# Patient Record
Sex: MALE | Race: WHITE | Employment: FULL TIME | URBAN - METROPOLITAN AREA
[De-identification: names, ages, dates, MRNs, and addresses within clinical notes are randomized per-mention and may not be internally consistent; named-entity substitution may affect disease eponyms.]

---

## 2017-01-12 ENCOUNTER — COMMUNICATION - HEALTHEAST (OUTPATIENT)
Dept: FAMILY MEDICINE | Facility: CLINIC | Age: 57
End: 2017-01-12

## 2017-02-14 ENCOUNTER — OFFICE VISIT - HEALTHEAST (OUTPATIENT)
Dept: FAMILY MEDICINE | Facility: CLINIC | Age: 57
End: 2017-02-14

## 2017-02-14 DIAGNOSIS — J32.9 SINUSITIS: ICD-10-CM

## 2017-02-14 DIAGNOSIS — R73.03 PREDIABETES: ICD-10-CM

## 2017-02-14 DIAGNOSIS — Z00.00 ROUTINE GENERAL MEDICAL EXAMINATION AT A HEALTH CARE FACILITY: ICD-10-CM

## 2017-02-14 DIAGNOSIS — L91.8 ACROCHORDON: ICD-10-CM

## 2017-02-14 LAB
CHOLEST SERPL-MCNC: 226 MG/DL
FASTING STATUS PATIENT QL REPORTED: YES
HBA1C MFR BLD: 6.5 % (ref 3.5–6.1)
HDLC SERPL-MCNC: 49 MG/DL
LDLC SERPL CALC-MCNC: 159 MG/DL
PSA SERPL-MCNC: 1.3 NG/ML (ref 0–3.5)
TRIGL SERPL-MCNC: 89 MG/DL

## 2017-02-14 ASSESSMENT — MIFFLIN-ST. JEOR: SCORE: 2008.05

## 2017-02-21 ENCOUNTER — OFFICE VISIT - HEALTHEAST (OUTPATIENT)
Dept: FAMILY MEDICINE | Facility: CLINIC | Age: 57
End: 2017-02-21

## 2017-02-21 DIAGNOSIS — E78.5 DYSLIPIDEMIA: ICD-10-CM

## 2017-02-21 DIAGNOSIS — E11.9 TYPE 2 DIABETES MELLITUS WITHOUT COMPLICATION, WITHOUT LONG-TERM CURRENT USE OF INSULIN (H): ICD-10-CM

## 2017-03-15 ENCOUNTER — RECORDS - HEALTHEAST (OUTPATIENT)
Dept: ADMINISTRATIVE | Facility: OTHER | Age: 57
End: 2017-03-15

## 2017-05-02 ENCOUNTER — COMMUNICATION - HEALTHEAST (OUTPATIENT)
Dept: FAMILY MEDICINE | Facility: CLINIC | Age: 57
End: 2017-05-02

## 2017-05-02 DIAGNOSIS — E78.5 HYPERLIPIDEMIA: ICD-10-CM

## 2017-07-15 ENCOUNTER — COMMUNICATION - HEALTHEAST (OUTPATIENT)
Dept: FAMILY MEDICINE | Facility: CLINIC | Age: 57
End: 2017-07-15

## 2017-07-15 DIAGNOSIS — E78.5 HYPERLIPIDEMIA: ICD-10-CM

## 2017-07-20 ENCOUNTER — COMMUNICATION - HEALTHEAST (OUTPATIENT)
Dept: FAMILY MEDICINE | Facility: CLINIC | Age: 57
End: 2017-07-20

## 2017-07-21 ENCOUNTER — COMMUNICATION - HEALTHEAST (OUTPATIENT)
Dept: FAMILY MEDICINE | Facility: CLINIC | Age: 57
End: 2017-07-21

## 2017-07-21 ENCOUNTER — COMMUNICATION - HEALTHEAST (OUTPATIENT)
Dept: SCHEDULING | Facility: CLINIC | Age: 57
End: 2017-07-21

## 2017-08-01 ENCOUNTER — OFFICE VISIT - HEALTHEAST (OUTPATIENT)
Dept: FAMILY MEDICINE | Facility: CLINIC | Age: 57
End: 2017-08-01

## 2017-08-01 DIAGNOSIS — E11.9 DIABETES MELLITUS (H): ICD-10-CM

## 2017-08-01 DIAGNOSIS — E78.5 DYSLIPIDEMIA: ICD-10-CM

## 2017-08-01 LAB
ALT SERPL W P-5'-P-CCNC: 25 U/L (ref 0–45)
CHOLEST SERPL-MCNC: 157 MG/DL
FASTING STATUS PATIENT QL REPORTED: YES
HBA1C MFR BLD: 6.2 % (ref 3.5–6.1)
HDLC SERPL-MCNC: 46 MG/DL
LDLC SERPL CALC-MCNC: 98 MG/DL
TRIGL SERPL-MCNC: 63 MG/DL

## 2017-08-02 ENCOUNTER — COMMUNICATION - HEALTHEAST (OUTPATIENT)
Dept: FAMILY MEDICINE | Facility: CLINIC | Age: 57
End: 2017-08-02

## 2017-08-02 DIAGNOSIS — E11.9 DIABETES (H): ICD-10-CM

## 2017-10-16 ENCOUNTER — AMBULATORY - HEALTHEAST (OUTPATIENT)
Dept: FAMILY MEDICINE | Facility: CLINIC | Age: 57
End: 2017-10-16

## 2017-10-16 ENCOUNTER — COMMUNICATION - HEALTHEAST (OUTPATIENT)
Dept: FAMILY MEDICINE | Facility: CLINIC | Age: 57
End: 2017-10-16

## 2017-11-13 ENCOUNTER — COMMUNICATION - HEALTHEAST (OUTPATIENT)
Dept: FAMILY MEDICINE | Facility: CLINIC | Age: 57
End: 2017-11-13

## 2017-11-14 ENCOUNTER — AMBULATORY - HEALTHEAST (OUTPATIENT)
Dept: FAMILY MEDICINE | Facility: CLINIC | Age: 57
End: 2017-11-14

## 2017-11-14 DIAGNOSIS — J32.9 SINUSITIS: ICD-10-CM

## 2018-01-04 ENCOUNTER — OFFICE VISIT - HEALTHEAST (OUTPATIENT)
Dept: OTOLARYNGOLOGY | Facility: CLINIC | Age: 58
End: 2018-01-04

## 2018-01-04 DIAGNOSIS — R43.1 PAROSMIA: ICD-10-CM

## 2018-01-11 ENCOUNTER — COMMUNICATION - HEALTHEAST (OUTPATIENT)
Dept: FAMILY MEDICINE | Facility: CLINIC | Age: 58
End: 2018-01-11

## 2018-01-11 DIAGNOSIS — E78.5 HYPERLIPIDEMIA: ICD-10-CM

## 2018-02-01 ENCOUNTER — HOSPITAL ENCOUNTER (OUTPATIENT)
Dept: CT IMAGING | Facility: CLINIC | Age: 58
Discharge: HOME | End: 2018-02-01
Attending: OTOLARYNGOLOGY

## 2018-02-01 DIAGNOSIS — R43.1 PAROSMIA: ICD-10-CM

## 2018-02-01 LAB
CREAT BLD-MCNC: 1 MG/DL
POC GFR AMER AF HE - HISTORICAL: >60 ML/MIN/1.73M2
POC GFR NON AMER AF HE - HISTORICAL: >60 ML/MIN/1.73M2

## 2018-02-07 ENCOUNTER — COMMUNICATION - HEALTHEAST (OUTPATIENT)
Dept: OTOLARYNGOLOGY | Facility: CLINIC | Age: 58
End: 2018-02-07

## 2018-04-19 ENCOUNTER — AMBULATORY - HEALTHEAST (OUTPATIENT)
Dept: FAMILY MEDICINE | Facility: CLINIC | Age: 58
End: 2018-04-19

## 2018-04-23 ENCOUNTER — COMMUNICATION - HEALTHEAST (OUTPATIENT)
Dept: ADMINISTRATIVE | Facility: CLINIC | Age: 58
End: 2018-04-23

## 2018-04-24 ENCOUNTER — AMBULATORY - HEALTHEAST (OUTPATIENT)
Dept: FAMILY MEDICINE | Facility: CLINIC | Age: 58
End: 2018-04-24

## 2018-04-24 DIAGNOSIS — K63.5 COLON POLYP: ICD-10-CM

## 2018-04-24 DIAGNOSIS — Z12.11 SCREENING FOR COLON CANCER: ICD-10-CM

## 2018-05-10 ENCOUNTER — RECORDS - HEALTHEAST (OUTPATIENT)
Dept: ADMINISTRATIVE | Facility: OTHER | Age: 58
End: 2018-05-10

## 2018-05-11 ENCOUNTER — RECORDS - HEALTHEAST (OUTPATIENT)
Dept: ADMINISTRATIVE | Facility: OTHER | Age: 58
End: 2018-05-11

## 2018-07-10 ENCOUNTER — COMMUNICATION - HEALTHEAST (OUTPATIENT)
Dept: FAMILY MEDICINE | Facility: CLINIC | Age: 58
End: 2018-07-10

## 2018-07-10 DIAGNOSIS — E78.5 HYPERLIPIDEMIA: ICD-10-CM

## 2019-04-24 ENCOUNTER — RECORDS - HEALTHEAST (OUTPATIENT)
Dept: ADMINISTRATIVE | Facility: OTHER | Age: 59
End: 2019-04-24

## 2019-04-30 ENCOUNTER — RECORDS - HEALTHEAST (OUTPATIENT)
Dept: ADMINISTRATIVE | Facility: OTHER | Age: 59
End: 2019-04-30

## 2019-05-22 ENCOUNTER — RECORDS - HEALTHEAST (OUTPATIENT)
Dept: ADMINISTRATIVE | Facility: OTHER | Age: 59
End: 2019-05-22

## 2019-07-09 ENCOUNTER — RECORDS - HEALTHEAST (OUTPATIENT)
Dept: ADMINISTRATIVE | Facility: OTHER | Age: 59
End: 2019-07-09

## 2019-08-28 ENCOUNTER — RECORDS - HEALTHEAST (OUTPATIENT)
Dept: ADMINISTRATIVE | Facility: OTHER | Age: 59
End: 2019-08-28

## 2021-03-12 ENCOUNTER — TRANSFERRED RECORDS (OUTPATIENT)
Dept: HEALTH INFORMATION MANAGEMENT | Facility: CLINIC | Age: 61
End: 2021-03-12

## 2021-03-19 ENCOUNTER — TRANSCRIBE ORDERS (OUTPATIENT)
Dept: OTHER | Age: 61
End: 2021-03-19

## 2021-03-19 DIAGNOSIS — H57.819 BROW PTOSIS: Primary | ICD-10-CM

## 2021-03-19 DIAGNOSIS — H02.839 DERMATOCHALASIS: ICD-10-CM

## 2021-03-24 ENCOUNTER — MEDICAL CORRESPONDENCE (OUTPATIENT)
Dept: HEALTH INFORMATION MANAGEMENT | Facility: CLINIC | Age: 61
End: 2021-03-24

## 2021-03-24 NOTE — TELEPHONE ENCOUNTER
FUTURE VISIT INFORMATION      FUTURE VISIT INFORMATION:    Date: 4.26.21    Time: 2:30     Location: [facility]  REFERRAL INFORMATION:    Referring provider:  IP    Referring providers clinic:  [facility]    Reason for visit/diagnosis: Brow Ptsosis OU, dermatochalasis each eye.    RECORDS REQUESTED FROM:       Clinic name Comments Records Status Imaging Status   [facility] 3.12.21 Adan Berman Scanned                                    Action 3.24.21 8:14 AM KRIS   Action Taken Requested records from the -[id number]

## 2021-04-26 ENCOUNTER — PRE VISIT (OUTPATIENT)
Dept: OPHTHALMOLOGY | Facility: CLINIC | Age: 61
End: 2021-04-26

## 2021-04-26 ENCOUNTER — OFFICE VISIT (OUTPATIENT)
Dept: OPHTHALMOLOGY | Facility: CLINIC | Age: 61
End: 2021-04-26
Attending: NURSE PRACTITIONER
Payer: COMMERCIAL

## 2021-04-26 ENCOUNTER — TELEPHONE (OUTPATIENT)
Dept: OPHTHALMOLOGY | Facility: CLINIC | Age: 61
End: 2021-04-26

## 2021-04-26 VITALS — WEIGHT: 234 LBS | BODY MASS INDEX: 34.66 KG/M2 | HEIGHT: 69 IN

## 2021-04-26 DIAGNOSIS — H57.819 BROW PTOSIS: ICD-10-CM

## 2021-04-26 DIAGNOSIS — H02.839 DERMATOCHALASIS: ICD-10-CM

## 2021-04-26 PROCEDURE — 99204 OFFICE O/P NEW MOD 45 MIN: CPT | Performed by: OPHTHALMOLOGY

## 2021-04-26 PROCEDURE — 92285 EXTERNAL OCULAR PHOTOGRAPHY: CPT | Performed by: OPHTHALMOLOGY

## 2021-04-26 PROCEDURE — 92082 INTERMEDIATE VISUAL FIELD XM: CPT | Performed by: OPHTHALMOLOGY

## 2021-04-26 RX ORDER — METFORMIN HCL 500 MG
TABLET, EXTENDED RELEASE 24 HR ORAL
COMMUNITY
Start: 2021-04-03

## 2021-04-26 RX ORDER — ATORVASTATIN CALCIUM 20 MG/1
TABLET, FILM COATED ORAL
COMMUNITY
Start: 2021-04-03

## 2021-04-26 ASSESSMENT — REFRACTION_WEARINGRX
OS_AXIS: 002
OD_SPHERE: +0.00
OS_CYLINDER: +0.75
SPECS_TYPE: PAL
OD_AXIS: 005
OS_SPHERE: +0.00
OD_ADD: +2.50
OS_ADD: +2.50
OD_CYLINDER: +1.25

## 2021-04-26 ASSESSMENT — CONF VISUAL FIELD
OS_SUPERIOR_TEMPORAL_RESTRICTION: 3
OD_SUPERIOR_TEMPORAL_RESTRICTION: 3
OS_SUPERIOR_NASAL_RESTRICTION: 3
OD_SUPERIOR_NASAL_RESTRICTION: 3

## 2021-04-26 ASSESSMENT — TONOMETRY
OS_IOP_MMHG: 8
OD_IOP_MMHG: 9
IOP_METHOD: ICARE

## 2021-04-26 ASSESSMENT — VISUAL ACUITY
OD_CC+: -2
OS_CC: J1+
OS_CC+: -3
OD_CC: J1+
OS_CC: 20/20
CORRECTION_TYPE: GLASSES
OD_CC: 20/20
METHOD: SNELLEN - LINEAR

## 2021-04-26 ASSESSMENT — MIFFLIN-ST. JEOR: SCORE: 1851.8

## 2021-04-26 NOTE — PROGRESS NOTES
Chief Complaints and History of Present Illnesses   Patient presents with     Consult For     Patient present today for Evaluation for Brow Ptosis/ Dermatochalasis each eye.        Chief Complaint(s) and History of Present Illness(es)     Consult For     In both eyes.  Associated symptoms include Negative for eye pain,   redness, tearing and dryness.  Treatments tried include no treatments.    Pain was noted as 0/10. Additional comments: Patient present today for   Evaluation for Brow Ptosis/ Dermatochalasis each eye.                 Comments     Here by recommendation of Dr Damon. Patient states that he finds the   need to try to force lids open to seem more to see the past few year. Does   get headaches the past few years. No pattern to headaches. Need more light   to read with shadowed vision. Peripheral vision is decrease with lids in   the way with each eye.     \No results found for: A1C      Sylviesa Mcrae, COT COT 2:26 PM April 26, 2021                      Assessment & Plan     Sean Bray is a 61 year old male with the following diagnoses:   1. Brow ptosis    2. Dermatochalasis       FUNCTIONAL COMPLAINTS RELATED TO DROOPY EYELIDS/BROWS:  Sean Bray describes upper lids interfering with superior visual field and interfering with activities of daily living including reading, driving and watching television.     EXAM:   Dominant eye right    MRD1: Right eye 2   Left eye 2 (with brows down)  Dermatochalasis with excess skin touching eyelashes   Brow ptosis with brow resting below superior orbital rim     VISUAL FIELD:  Right eye untaped:0 degrees Right eye taped:55 degrees  Left eye untaped:0 degrees Left eye taped:55 degrees    Right eye visual field improves by: 55 degrees  Left eye visual field improves by: 55 degrees      Bilateral upper blepharoplasty skin/muscle  Bilateral brow ptosis repair (direct brow lift) - Brow ptosis repair is being performed to support and lift the brows which are  resting below the orbital rim and to prevent post blepharoplasty brow descent which will make the lid position worse.      Attending Physician Attestation:  Complete documentation of historical and exam elements from today's encounter can be found in the full encounter summary report (not reduplicated in this progress note).  I personally obtained the chief complaint(s) and history of present illness.  I confirmed and edited as necessary the review of systems, past medical/surgical history, family history, social history, and examination findings as documented by others; and I examined the patient myself.  I personally reviewed the relevant tests, images, and reports as documented above.  I formulated and edited as necessary the assessment and plan and discussed the findings and management plan with the patient and family. I personally reviewed the ophthalmic test(s) associated with this encounter, agree with the interpretation(s) as documented by the resident/fellow, and have edited the corresponding report(s) as necessary.   -Geremias Gil MD  2:50 PM 4/26/2021

## 2021-04-26 NOTE — NURSING NOTE
Chief Complaints and History of Present Illnesses   Patient presents with     Consult For     Patient present today for Evaluation for Brow Ptosis/ Dermatochalasis each eye.        Chief Complaint(s) and History of Present Illness(es)     Consult For     Laterality: both eyes    Associated symptoms: Negative for eye pain, redness, tearing and dryness    Treatments tried: no treatments    Pain scale: 0/10    Comments: Patient present today for Evaluation for Brow Ptosis/ Dermatochalasis each eye.                 Comments     Here by recommendation of Dr Damon. Patient states that he finds the need to try to force lids open to seem more to see the past few year. Does get headaches the past few years. No pattern to headaches. Need more light to read with shadowed vision. Peripheral vision is decrease with lids in the way with each eye.     Sylvie Mcrae, COT COT 2:26 PM April 26, 2021

## 2021-04-26 NOTE — NURSING NOTE
Chief Complaints and History of Present Illnesses   Patient presents with     Consult For     Patient present today for Evaluation for Brow Ptosis/ Dermatochalasis each eye.        Chief Complaint(s) and History of Present Illness(es)     Consult For     Laterality: both eyes    Associated symptoms: Negative for eye pain, redness, tearing and dryness    Treatments tried: no treatments    Pain scale: 0/10    Comments: Patient present today for Evaluation for Brow Ptosis/ Dermatochalasis each eye.                 Comments     Here by recommendation of Dr Damon. Patient states that he finds the need to try to force lids open to seem more to see the past few year. Does get headaches the past few years. No pattern to headaches. Need more light to read with shadowed vision. Peripheral vision is decrease with lids in the way with each eye.     \No results found for: A1C      Sylvie Mcrae, COT COT 2:26 PM April 26, 2021

## 2021-04-26 NOTE — TELEPHONE ENCOUNTER
Spoke with patient to schedule surgery with Dr. Gil    Surgery was scheduled on 6/2 at [facility]  Patient will have H&P at [facility]     Patient is aware a COVID-19 test is needed before their procedure. The test should be with-in 4 days of their procedure.   Test Details: Date 5/29 OR 6/1 Location [place], [place]    Post-Op visit was scheduled on 6/18  Patient is aware a / is needed day of surgery.   Surgery packet was mailed 4/26, patient has my direct contact information for any further questions.

## 2021-05-01 ENCOUNTER — HEALTH MAINTENANCE LETTER (OUTPATIENT)
Age: 61
End: 2021-05-01

## 2021-05-16 DIAGNOSIS — Z11.59 ENCOUNTER FOR SCREENING FOR OTHER VIRAL DISEASES: ICD-10-CM

## 2021-05-19 ENCOUNTER — TRANSFERRED RECORDS (OUTPATIENT)
Dept: HEALTH INFORMATION MANAGEMENT | Facility: CLINIC | Age: 61
End: 2021-05-19

## 2021-05-19 ENCOUNTER — TELEPHONE (OUTPATIENT)
Dept: OPHTHALMOLOGY | Facility: CLINIC | Age: 61
End: 2021-05-19

## 2021-05-19 NOTE — TELEPHONE ENCOUNTER
Returned called and received voicemail. I left my direct dial number and told him I would try back tomorrow.  Luz Marina Ramos RN RN 3:43 PM 05/19/21

## 2021-05-19 NOTE — TELEPHONE ENCOUNTER
[facility] Call Center    Phone Message    May a detailed message be left on voicemail: yes     Reason for Call: Other: Sean calling to request a call back. He has some questions on his upcoming procedure 06/02 and would like to speak to his care team. Please call him back to discuss.       Action Taken: Message routed to:  [facility] ([facility]  eye    Travel Screening: Not Applicable

## 2021-05-21 NOTE — TELEPHONE ENCOUNTER
Patient left a voicemail and has no other questions.  Luz Marina Ramos RN RN 11:48 AM 05/21/21

## 2021-05-30 VITALS — BODY MASS INDEX: 38.18 KG/M2 | WEIGHT: 266.7 LBS | HEIGHT: 70 IN

## 2021-05-31 VITALS — BODY MASS INDEX: 36.65 KG/M2 | WEIGHT: 251.8 LBS

## 2021-06-01 ENCOUNTER — ANESTHESIA EVENT (OUTPATIENT)
Dept: SURGERY | Facility: AMBULATORY SURGERY CENTER | Age: 61
End: 2021-06-01
Payer: COMMERCIAL

## 2021-06-02 ENCOUNTER — ANESTHESIA (OUTPATIENT)
Dept: SURGERY | Facility: AMBULATORY SURGERY CENTER | Age: 61
End: 2021-06-02
Payer: COMMERCIAL

## 2021-06-02 ENCOUNTER — HOSPITAL ENCOUNTER (OUTPATIENT)
Facility: AMBULATORY SURGERY CENTER | Age: 61
End: 2021-06-02
Attending: OPHTHALMOLOGY
Payer: COMMERCIAL

## 2021-06-02 VITALS
WEIGHT: 239 LBS | SYSTOLIC BLOOD PRESSURE: 132 MMHG | HEART RATE: 64 BPM | RESPIRATION RATE: 16 BRPM | DIASTOLIC BLOOD PRESSURE: 64 MMHG | BODY MASS INDEX: 34.22 KG/M2 | TEMPERATURE: 97.6 F | OXYGEN SATURATION: 95 % | HEIGHT: 70 IN

## 2021-06-02 DIAGNOSIS — H57.819 BROW PTOSIS: ICD-10-CM

## 2021-06-02 DIAGNOSIS — H02.839 DERMATOCHALASIS: ICD-10-CM

## 2021-06-02 LAB — GLUCOSE BLDC GLUCOMTR-MCNC: 151 MG/DL (ref 70–99)

## 2021-06-02 PROCEDURE — 67900 REPAIR BROW DEFECT: CPT | Mod: LT

## 2021-06-02 PROCEDURE — 15823 BLEPHARP UPR EYELID XCSV SKN: CPT | Mod: LT

## 2021-06-02 RX ORDER — LIDOCAINE HYDROCHLORIDE AND EPINEPHRINE 10; 10 MG/ML; UG/ML
INJECTION, SOLUTION INFILTRATION; PERINEURAL PRN
Status: DISCONTINUED | OUTPATIENT
Start: 2021-06-02 | End: 2021-06-02 | Stop reason: HOSPADM

## 2021-06-02 RX ORDER — NALOXONE HYDROCHLORIDE 0.4 MG/ML
0.4 INJECTION, SOLUTION INTRAMUSCULAR; INTRAVENOUS; SUBCUTANEOUS
Status: DISCONTINUED | OUTPATIENT
Start: 2021-06-02 | End: 2021-06-03 | Stop reason: HOSPADM

## 2021-06-02 RX ORDER — LIDOCAINE 40 MG/G
CREAM TOPICAL
Status: DISCONTINUED | OUTPATIENT
Start: 2021-06-02 | End: 2021-06-02 | Stop reason: HOSPADM

## 2021-06-02 RX ORDER — LIDOCAINE HYDROCHLORIDE 20 MG/ML
INJECTION, SOLUTION INFILTRATION; PERINEURAL PRN
Status: DISCONTINUED | OUTPATIENT
Start: 2021-06-02 | End: 2021-06-02

## 2021-06-02 RX ORDER — NALOXONE HYDROCHLORIDE 0.4 MG/ML
0.2 INJECTION, SOLUTION INTRAMUSCULAR; INTRAVENOUS; SUBCUTANEOUS
Status: DISCONTINUED | OUTPATIENT
Start: 2021-06-02 | End: 2021-06-03 | Stop reason: HOSPADM

## 2021-06-02 RX ORDER — OXYCODONE HYDROCHLORIDE 5 MG/1
5 TABLET ORAL EVERY 6 HOURS PRN
Qty: 8 TABLET | Refills: 0 | Status: SHIPPED | OUTPATIENT
Start: 2021-06-02 | End: 2021-06-05

## 2021-06-02 RX ORDER — OXYCODONE HYDROCHLORIDE 5 MG/1
5 TABLET ORAL EVERY 4 HOURS PRN
Status: DISCONTINUED | OUTPATIENT
Start: 2021-06-02 | End: 2021-06-03 | Stop reason: HOSPADM

## 2021-06-02 RX ORDER — ERYTHROMYCIN 5 MG/G
OINTMENT OPHTHALMIC
Qty: 3.5 G | Refills: 0 | Status: SHIPPED | OUTPATIENT
Start: 2021-06-02 | End: 2021-06-18

## 2021-06-02 RX ORDER — ACETAMINOPHEN 325 MG/1
975 TABLET ORAL ONCE
Status: COMPLETED | OUTPATIENT
Start: 2021-06-02 | End: 2021-06-02

## 2021-06-02 RX ORDER — PROPOFOL 10 MG/ML
INJECTION, EMULSION INTRAVENOUS CONTINUOUS PRN
Status: DISCONTINUED | OUTPATIENT
Start: 2021-06-02 | End: 2021-06-02

## 2021-06-02 RX ORDER — MEPERIDINE HYDROCHLORIDE 25 MG/ML
12.5 INJECTION INTRAMUSCULAR; INTRAVENOUS; SUBCUTANEOUS
Status: DISCONTINUED | OUTPATIENT
Start: 2021-06-02 | End: 2021-06-03 | Stop reason: HOSPADM

## 2021-06-02 RX ORDER — ONDANSETRON 4 MG/1
4 TABLET, ORALLY DISINTEGRATING ORAL EVERY 30 MIN PRN
Status: DISCONTINUED | OUTPATIENT
Start: 2021-06-02 | End: 2021-06-03 | Stop reason: HOSPADM

## 2021-06-02 RX ORDER — FENTANYL CITRATE 50 UG/ML
25-50 INJECTION, SOLUTION INTRAMUSCULAR; INTRAVENOUS
Status: DISCONTINUED | OUTPATIENT
Start: 2021-06-02 | End: 2021-06-02 | Stop reason: HOSPADM

## 2021-06-02 RX ORDER — TETRACAINE HYDROCHLORIDE 5 MG/ML
SOLUTION OPHTHALMIC PRN
Status: DISCONTINUED | OUTPATIENT
Start: 2021-06-02 | End: 2021-06-02 | Stop reason: HOSPADM

## 2021-06-02 RX ORDER — PROPOFOL 10 MG/ML
INJECTION, EMULSION INTRAVENOUS PRN
Status: DISCONTINUED | OUTPATIENT
Start: 2021-06-02 | End: 2021-06-02

## 2021-06-02 RX ORDER — SODIUM CHLORIDE, SODIUM LACTATE, POTASSIUM CHLORIDE, CALCIUM CHLORIDE 600; 310; 30; 20 MG/100ML; MG/100ML; MG/100ML; MG/100ML
INJECTION, SOLUTION INTRAVENOUS CONTINUOUS
Status: DISCONTINUED | OUTPATIENT
Start: 2021-06-02 | End: 2021-06-02 | Stop reason: HOSPADM

## 2021-06-02 RX ORDER — GABAPENTIN 300 MG/1
300 CAPSULE ORAL ONCE
Status: COMPLETED | OUTPATIENT
Start: 2021-06-02 | End: 2021-06-02

## 2021-06-02 RX ORDER — SODIUM CHLORIDE, SODIUM LACTATE, POTASSIUM CHLORIDE, CALCIUM CHLORIDE 600; 310; 30; 20 MG/100ML; MG/100ML; MG/100ML; MG/100ML
INJECTION, SOLUTION INTRAVENOUS CONTINUOUS
Status: DISCONTINUED | OUTPATIENT
Start: 2021-06-02 | End: 2021-06-03 | Stop reason: HOSPADM

## 2021-06-02 RX ORDER — ERYTHROMYCIN 5 MG/G
OINTMENT OPHTHALMIC PRN
Status: DISCONTINUED | OUTPATIENT
Start: 2021-06-02 | End: 2021-06-02 | Stop reason: HOSPADM

## 2021-06-02 RX ORDER — ONDANSETRON 2 MG/ML
4 INJECTION INTRAMUSCULAR; INTRAVENOUS EVERY 30 MIN PRN
Status: DISCONTINUED | OUTPATIENT
Start: 2021-06-02 | End: 2021-06-03 | Stop reason: HOSPADM

## 2021-06-02 RX ADMIN — PROPOFOL 150 MCG/KG/MIN: 10 INJECTION, EMULSION INTRAVENOUS at 09:19

## 2021-06-02 RX ADMIN — ACETAMINOPHEN 975 MG: 325 TABLET ORAL at 08:30

## 2021-06-02 RX ADMIN — PROPOFOL 50 MG: 10 INJECTION, EMULSION INTRAVENOUS at 09:19

## 2021-06-02 RX ADMIN — SODIUM CHLORIDE, SODIUM LACTATE, POTASSIUM CHLORIDE, CALCIUM CHLORIDE: 600; 310; 30; 20 INJECTION, SOLUTION INTRAVENOUS at 08:41

## 2021-06-02 RX ADMIN — LIDOCAINE HYDROCHLORIDE 100 MG: 20 INJECTION, SOLUTION INFILTRATION; PERINEURAL at 09:18

## 2021-06-02 RX ADMIN — GABAPENTIN 300 MG: 300 CAPSULE ORAL at 08:30

## 2021-06-02 SDOH — HEALTH STABILITY: MENTAL HEALTH: HOW OFTEN DO YOU HAVE A DRINK CONTAINING ALCOHOL?: NEVER

## 2021-06-02 ASSESSMENT — MIFFLIN-ST. JEOR: SCORE: 1882.41

## 2021-06-02 NOTE — ANESTHESIA POSTPROCEDURE EVALUATION
Patient: Sean Bray    Procedure(s):  Bilateral upper BLEPHAROPLASTY  Bilateral direct brow lift    Diagnosis:Brow ptosis [H57.819]  Dermatochalasis [H02.839]  Diagnosis Additional Information: No value filed.    Anesthesia Type:  MAC    Note:  Disposition: Outpatient   Postop Pain Control: Uneventful            Sign Out: Well controlled pain   PONV: No   Neuro/Psych: Uneventful            Sign Out: Acceptable/Baseline neuro status   Airway/Respiratory: Uneventful            Sign Out: Acceptable/Baseline resp. status   CV/Hemodynamics: Uneventful            Sign Out: Acceptable CV status; No obvious hypovolemia; No obvious fluid overload   Other NRE: NONE   DID A NON-ROUTINE EVENT OCCUR? No           Last vitals:  Vitals:    06/02/21 1009 06/02/21 1012 06/02/21 1028   BP: (!) 89/49 105/48 132/64   Pulse: 72  64   Resp: 16  16   Temp: 36.4  C (97.6  F)     SpO2: 94%  95%       Last vitals prior to Anesthesia Care Transfer:  CRNA VITALS  6/2/2021 0935 - 6/2/2021 1035      6/2/2021             Resp Rate (set):  10          Electronically Signed By: Roc Stein MD  June 2, 2021  12:41 PM

## 2021-06-02 NOTE — OP NOTE
PREOPERATIVE DIAGNOSIS: Bilateral brow ptosis. Bilateral upper lids dermatochalasis.     POSTOPERATIVE DIAGNOSIS: Bilateral brow ptosis. Bilateral upper lids dermatochalasis.     PROCEDURE PERFORMED: Bilateral direct brow lift. Bilateral upper blepharoplasty.     SURGEON: Geremias Gil MD     ASSISTANT: Lenard Caldera MD and Marybeth Alvarenga MD     ANESTHESIA: Monitored with local infiltration 1% lidocaine with epinephrine [id number].     COMPLICATIONS: None.     ESTIMATED BLOOD LOSS: Less than 5 cc.     INDICATIONS: Sean Bray  presented with bilateral brow ptosis with the eyebrows resting below the orbital rim and dermatochalasis obstructing the superior and lateral visual fields. After the risks, benefits and alternatives to the proposed procedure were explained to the patient Informed Consent was obtained.     DESCRIPTION OF PROCEDURE: Sean Bray  was marked in the preoperative area. The superior brow hairs were marked, and the brow was lifted to its expected height, allowed to descend, and this point was marked on the eyebrow. An ellipse was drawn encompassing these marks on each side. Sean Bray  was brought to the operating room and placed supine on the operating table. The upper lid crease and excess upper eyelid skin was marked with marking pen and infiltrated with local anesthetic.  IV sedation was given. The brow areas and upper lids were infiltrated with a local anesthetic mixture, and the patient was prepped and draped in the typical sterile ophthalmic fashion. Attention was directed to the right side. The skin was incised following the marked lines. Skin and subcutaneous tissue were excised with a Mitzi scissors. Hemostasis was obtained with monopolar cautery. The deep tissues were closed with interrupted buried 5-0 Vicryl sutures securing the dermis. The skin was closed with a running 6-0 Prolene suture. Erythromycin ointment was applied. Attention was directed to the left side  where the same procedure was performed.   Attention was directed to the right side. Skin was incised following marked lines. Skin flap was excised with a high temperature cautery. A row of cautery was placed in the orbicularis along the inferior incision line. A strip of orbicularis was excised superiorly.   Hemostasis was obtained and the skin closed with running 6-0 plain gut suture. Attention was directed to the left side where the same procedure was performed.  Ophthalmic antibiotic ointment was applied to the eyelids and into the eyes.    The patient tolerated the procedures well. Sean Bray  left the operating room in stable condition.     MIKE THOMAS MD

## 2021-06-02 NOTE — ANESTHESIA CARE TRANSFER NOTE
Patient: Sean Bray    Procedure(s):  Bilateral upper BLEPHAROPLASTY  Bilateral direct brow lift    Diagnosis: Brow ptosis [H57.819]  Dermatochalasis [H02.839]  Diagnosis Additional Information: No value filed.    Anesthesia Type:   MAC     Note:      Level of Consciousness: awake  Oxygen Supplementation: room air    Independent Airway: airway patency satisfactory and stable  Dentition: dentition unchanged  Vital Signs Stable: post-procedure vital signs reviewed and stable  Report to RN Given: handoff report given  Patient transferred to: Phase II    Handoff Report: Identifed the Patient, Identified the Reponsible Provider, Reviewed the pertinent medical history, Discussed the surgical course, Reviewed Intra-OP anesthesia mangement and issues during anesthesia, Set expectations for post-procedure period and Allowed opportunity for questions and acknowledgement of understanding      Vitals: (Last set prior to Anesthesia Care Transfer)  CRNA VITALS  6/2/2021 0935 - 6/2/2021 1006      6/2/2021             Resp Rate (set):  10        Electronically Signed By: FANG Anaya CRNA  June 2, 2021  10:06 AM

## 2021-06-02 NOTE — DISCHARGE INSTRUCTIONS
Post-operative Instructions    Ophthalmic Plastic and Reconstructive Surgery  Geremias Gil M.D.  Lenard Caldera M.D., M.P.H.    All instructions apply to the operated eye(s) or eyelid(s)    What to expect after surgery:    There will be some swelling, bruising, and likely a black eye (even into the lower eyelids and cheeks). Also expect crusting and discharge from the eye and/or incisions.     A small amount of surface bleeding is normal for the first 48 hours after surgery.    You may notice some bloody tears for the first few days after surgery. This is normal.    Your eye(s) and eyelid(s) may be painful and tender. This is normal after surgery. Use the pain medication as prescribed. If your pain does not improve despite the medication, contact the office.    Wound care and personal care:    If a patch or bandage has been placed, please leave this in place until seen in clinic. Prevent the bandage from getting wet.     Apply ice compresses 15 minutes on 15 minutes off while awake for the first 2 days after surgery, then switch to warm compresses 4 times a day until seen by your physician.     For warm packs you can place a cup of dry uncooked rice in a clean cotton sock. Place sock in microwave 30 seconds to one minute. Next place the warm sock into a plastic bag and wrap the bag with clean warm wet washcloth and place over operated eye.      You may shower or wash your hair the day after surgery. Do not bathe or go swimming for 1 week to prevent contamination of your wounds.    Do not apply make-up to the eyes or eyelids for 2 weeks after surgery.    Activity restrictions and driving:    Avoid heavy lifting, bending, exercise or strenuous activity for 1 week after surgery.    You may resume other activities and return to work as tolerated.    You may not resume driving until have you stopped using narcotic pain medications(such as Norco, Percocet, Tylenol #3).    Sinus Precautions for 1 week: Sneeze  with mouth open. Cough with mouth open. No blowing nose. No straws. No bending over.    Medications:    Restart all your regular home medications and eye drops today. If you take Plavix or Aspirin on a regular basis, wait for 3 days after your surgery before restarting these in order to decrease the risk of bleeding complications.    Avoid aspirin and aspirin-like medications (Motrin, Aleve, Ibuprofen, Sabina-Mallie etc) for 5 days to reduce the risk of bleeding. You may take Tylenol (acetaminophen) for pain.    In addition to your home medications, take the following post-operative medications as prescribed by your physician:    Apply antibiotic ointment (erythromycin) to all sutures three times a day, and into the operated eye(s) at night.     Take scheduled extra strength Tylenol for pain.  You may take 1 to 2 pain pills (norco or oxycodone as prescribed) as needed for breakthrough pain up to every 6 hours.    The pain pills may make you drowsy. You must not drive a car, operate heavy machinery or drink alcohol while taking them.    The pain pills may cause constipation and nausea. Take them with some food to prevent a stomach upset. If you continue to experience nausea, call your physician.      WARNING: All the prescription pain medications listed above contain Tylenol (acetaminophen). You must not take more than 4,000 mg of acetaminophen per 24-hour period. This is equivalent to 6 tablets of Darvocet, 8 tablets of Vicodin, or 12 tablets of Norco, Percocet or Tylenol #3. If you take other over-the-counter medications containing acetaminophen, you must take the amount of acetaminophen into account and reduce the number of prescribed pain pills accordingly.    Contact information and follow-up:    Return to the Eye Clinic for a follow-up appointment with your physician as  scheduled. If no appointment has been scheduled, call [phone] for an  appointment with Dr. Gil within 1 to 2 weeks from your date of  "surgery.  -     Please email a few photos of your eye(s) or other operative site(s) to [email or web address] prior to your follow up visit.      For severe pain, bleeding, or loss of vision, call the Eye Clinic at [phone].    After hours or on weekends and holidays, call [phone] and ask to speak with the ophthalmologist on call.      [facility]  Home Care Following Anesthesia  For 24 hours after surgery:  1. Get plenty of rest.  A responsible adult must stay with you for at least 24 hours after you leave the surgery center.  2. Do not drive or use heavy equipment.  If you have weakness or tingling, don't drive or use heavy equipment until this feeling goes away.   3. Do not drink alcohol.   4. Avoid strenuous or risky activities.  Ask for help when climbing stairs.  5. You may feel lightheaded.  IF so, sit for a few minutes before standing.  Have someone help you get up.   6. If you have nausea (feel sick to your stomach): Drink only clear liquids such as apple juice, ginger ale, broth or 7-Up.  Rest may also help.  Be sure to drink enough fluids.  Move to a regular diet as you feel able.   7. You may have a slight fever.  Call the doctor if your fever is over 100 F (37.7 C) (taken under the tongue) or lasts longer than 24 hours.  8. You may have a dry mouth, a sore throat, muscle aches or trouble sleeping. These should go away after 24 hours.  9. Do not make important or legal decisions.   10. It is recommended to avoid smoking.        Today you received a Marcaine or bupivacaine block to numb the nerves near your surgery site.  This is a block using local anesthetic or \"numbing\" medication injected around the nerves to anesthetize or \"numb\" the area supplied by those nerves.  This block is injected into the muscle layer near your surgical site.  The medication may numb the location where you had surgery for 6-18 hours, but may last up to 24 hours.  If your surgical " site is an arm or leg you should be careful with your affected limb, since it is possible to injure your limb without being aware of it due to the numbing.  Until full feeling returns, you should guard against bumping or hitting your limb, and avoid extreme hot or cold temperatures on the skin.  As the block wears off, the feeling will return as a tingling or prickly sensation near your surgical site.  You will experience more discomfort from your incision as the feeling returns.  You may want to take a pain pill (a narcotic or Tylenol if this was prescribed by your surgeon) when you start to experience mild pain before the pain beccomes more severe.  If your pain medications do not control your pain you should notifiy your surgeon.    Tips for taking pain medications  To get the best pain relief possible, remember these points:    Take pain medications as directed, before pain becomes severe.    Pain medication can upset your stomach: taking it with food may help.    Constipation is a common side effect of pain medication. Drink plenty of  fluids.    Eat foods high in fiber. Take a stool softener if recommended by your doctor or pharmacist.    Do not drink alcohol, drive or operate machinery while taking pain medications.    Ask about other ways to control pain, such as with heat, ice or relaxation.    Tylenol/Acetaminophen Consumption  To help encourage the safe use of acetaminophen, the makers of TYLENOL  have lowered the maximum daily dose for single-ingredient Extra Strength TYLENOL  (acetaminophen) products sold in the [place] from 8 pills per day (4,000 mg) to 6 pills per day (3,000 mg). The dosing interval has also changed from 2 pills every 4-6 hours to 2 pills every 6 hours.    If you feel your pain relief is insufficient, you may take Tylenol/Acetaminophen in addition to your narcotic pain medication.     Be careful not to exceed 3,000 mg of Tylenol/Acetaminophen in a 24 hour period from all sources.    If  you are taking extra strength Tylenol/acetaminophen (500 mg), the maximum dose is 6 tablets in 24 hours.    If you are taking regular strength acetaminophen (325 mg), the maximum dose is 9 tablets in 24 hours.    Call a doctor for any of the followin. Signs of infection (fever, growing tenderness at the surgery site, a large amount of drainage or bleeding, severe pain, foul-smelling drainage, redness, swelling).  2. It has been over 8 to 10 hours since surgery and you are still not able to urinate (pass water).  3. Headache for over 24 hours.  4. Numbness, tingling or weakness the day after surgery (if you had spinal anesthesia).  5. Signs of Covid-19 infection (temperature over 100 degrees, shortness of breath, cough, loss of taste/smell, generalized body aches, persistent headache, chills, sore throat, nausea/vomiting/diarrhea)  Your doctor is:  Dr. Geremias Gil, Ophthalmology: [phone]  Or dial [phone] and ask for the resident on call for:  Ophthalmology  For emergency care, call the:  [facility]:  [phone] (TTY for hearing impaired: [phone])

## 2021-06-02 NOTE — ANESTHESIA PREPROCEDURE EVALUATION
Anesthesia Pre-Procedure Evaluation    Patient: Sean Bray   MRN: [id number] : 1960        Preoperative Diagnosis: Brow ptosis [H57.819]  Dermatochalasis [H02.839]   Procedure : Procedure(s):  Bilateral upper BLEPHAROPLASTY  Bilateral direct brow lift     Past Medical History:   Diagnosis Date     Broken humerus 2019     Diabetes (H)      Hyperlipidemia       Past Surgical History:   Procedure Laterality Date     CIRCUMCISION       VASECTOMY        No Known Allergies   Social History     Tobacco Use     Smoking status: Former Smoker     Quit date:      Years since quittin.4     Smokeless tobacco: Never Used   Substance Use Topics     Alcohol use: Never     Frequency: Never      Wt Readings from Last 1 Encounters:   21 108.4 kg (239 lb)        Anesthesia Evaluation            ROS/MED HX  ENT/Pulmonary:       Neurologic:       Cardiovascular:     (+) Dyslipidemia -----    METS/Exercise Tolerance:     Hematologic:       Musculoskeletal:       GI/Hepatic:       Renal/Genitourinary:       Endo:     (+) type II DM, Obesity,     Psychiatric/Substance Use:       Infectious Disease:       Malignancy:       Other:            Physical Exam    Airway        Mallampati: II   TM distance: > 3 FB      Respiratory Devices and Support         Dental           Cardiovascular          Rhythm and rate: regular and normal     Pulmonary           breath sounds clear to auscultation           OUTSIDE LABS:  CBC: No results found for: WBC, HGB, HCT, PLT  BMP: No results found for: NA, POTASSIUM, CHLORIDE, CO2, BUN, CR, GLC  COAGS: No results found for: PTT, INR, FIBR  POC:   Lab Results   Component Value Date     (H) 2021     HEPATIC: No results found for: ALBUMIN, PROTTOTAL, ALT, AST, GGT, ALKPHOS, BILITOTAL, BILIDIRECT, BALTAZAR  OTHER: No results found for: PH, LACT, A1C, MELISSA, PHOS, MAG, LIPASE, AMYLASE, TSH, T4, T3, CRP, SED    Anesthesia Plan    ASA Status:  3      Anesthesia Type: MAC.               Consents    Anesthesia Plan(s) and associated risks, benefits, and realistic alternatives discussed. Questions answered and patient/representative(s) expressed understanding.     - Discussed with:  Patient         Postoperative Care            Comments:         H&P reviewed: Unable to attach H&P to encounter due to EHR limitations. H&P Update: appropriate H&P reviewed, patient examined. No interval changes since H&P (within 30 days).         Roc Stein MD

## 2021-06-02 NOTE — BRIEF OP NOTE
[facility] Brief Operative Note    Pre-operative diagnosis: Brow ptosis [H57.819]  Dermatochalasis [H02.839]   Post-operative diagnosis Same   Procedure: Procedure(s):  Bilateral upper BLEPHAROPLASTY  Bilateral direct brow lift   Surgeon(s): Surgeon(s) and Role:     * Geremias Gil MD - Primary     * Lenard Caldera MD - Fellow - Assisting   Estimated blood loss: 2mL    Specimens: * No specimens in log *   Findings: As expected

## 2021-06-04 ENCOUNTER — TELEPHONE (OUTPATIENT)
Dept: OPHTHALMOLOGY | Facility: CLINIC | Age: 61
End: 2021-06-04

## 2021-06-08 NOTE — PROGRESS NOTES
ASSESSMENT & PLAN:  1. Routine general medical examination at a health care facility  - Lipid Cascade  - HM2(CBC w/o Differential)  - Glucose  - PSA (Prostatic-Specific Antigen), Annual Screen    2. Prediabetes  - Glycosylated Hemoglobin A1c    3. Sinusitis    4. Acrochordon     He is fasting.  We will do labs.  Limit sweets, carbs.  Healthy food choices.  Continue with physical activity.  For his sinusitis, will add prednisone short course.  Discussed medication and side effects.  Recheck if worse in the next 1-2 weeks.  For the skin tag, discussed of its benign nature.  Monitor for changes with time.  Encourage annual physical.  Colonoscopy in April 2018 unless changes noted.  Discussed hepatitis C screening as he had questions with it.  Declines at this time.  He was agreeable with the plans.    Orders Placed This Encounter   Procedures     Lipid Cascade     Order Specific Question:   Fasting is required?     Answer:   Yes     HM2(CBC w/o Differential)     Glucose     Glycosylated Hemoglobin A1c     PSA (Prostatic-Specific Antigen), Annual Screen       CHIEF COMPLAINT:  Chief Complaint   Patient presents with     Annual Exam     Pt is FASTING today.         HISTORY OF PRESENT ILLNESS:  Sean is a 56 y.o. male presenting to the clinic today for an annual exam, to discuss a skin tag, and to follow up on his sinusitis.    Chronic Sinusitis: He still has a persistent nasal issues; he has not smelled smoke for the past 4 days, but he has for every day prior to that since his last office visit. He has been noticing green and clear mucus and nasal discharge. He denies any fever, chills, emesis, nasal drainage to throat, hemoptysis, or any taste of blood in his mouth. He denies any exposure to illness; he does go to the gym 4-5 times per week, but he denies any recent travel. He was last seen on 12/20/2016 and he has finished his Omnicef antibiotic as prescribed for sinusitis; he has been using saline spray and Flonase  since then as well. He uses a saline spray when he is feeling more congested than normal. He uses Flonase once daily for 3 days in a row, then he takes a couple days off before starting it again. He has never used a Neti Pot.     Right Underarm Skin Tag: He noticed a mole in his right underarm while showering about 1 month ago. It was painful when he irritated it but it is not painful at all times. It has been changing in shape a bit, but not in color. It gets bigger and smaller on its own. It does not bleed on its own. He states it is less of a mole and more of a protrusion. He denies any history of skin cancer, but there is a recurring cyst on his nape region.     Health Maintenance: He is fasting today. He does not smoke or drink alcohol. His last colonoscopy was done in April 2015; he is due for repeat in April 2018. He is wondering if he should be tested for hepatitis C since his mother had non-alcoholic cirrhosis.     REVIEW OF SYSTEMS:   Obesity: He goes to the gym 4-5 days per week; he does weight training 3 days per week and rowing, bicycling, and pool the other days. He does 30 minutes of aerobic exercise each session. His weight loss has plateaued and so he is following a [employer] weight loss plan. He is a healthy eater; he has 2 cups of vegetables at lunch and dinner, lean protein once per day, protein shake in the morning, and three snacks per day of a 15 gram protein bar.  He does not eat sweets. He eats red meat occasionally in small portions.    He denies any fever, chills, cough, URI's, hematuria, hemoptysis, hematochezia, dizziness, LOC, chest pain, heart palpitations, SOB, abdominal pain, pelvic pain, dysuria, sudden weight loss, or changes in stool caliber. He denies any new chronic health conditions, hospitalizations or surgeries since his last office visit. All other systems are negative.     PFSH:    History   Smoking Status     Former Smoker     Packs/day: 1.00     Years: 25.00      "Types: Cigarettes     Quit date: 7/3/2007   Smokeless Tobacco     Never Used        Family History   Problem Relation Age of Onset     Cirrhosis Mother      non-alcoholic     Diabetes Mother      Colon cancer Father 75     Prostate cancer Father 60     Alzheimer's disease Father      Heart disease Paternal Grandfather      Heart disease Paternal Grandmother      Heart attack Maternal Grandfather      Colon polyps Sister      Cancer Other      Niece, blood     Diabetes Maternal Grandmother         Social History     Social History     Marital status:      Spouse name: N/A     Number of children: N/A     Years of education: N/A     Occupational History     Not on file.     Social History Main Topics     Smoking status: Former Smoker     Packs/day: 1.00     Years: 25.00     Types: Cigarettes     Quit date: 7/3/2007     Smokeless tobacco: Never Used     Alcohol use Not on file     Drug use: Not on file     Sexual activity: Not on file     Other Topics Concern     Not on file     Social History Narrative        Past Surgical History:   Procedure Laterality Date     CIRCUMCISION       VASECTOMY          No Known Allergies     Active Ambulatory Problems     Diagnosis Date Noted     Hyperlipidemia 12/29/2008     Borderline diabetes mellitus 03/14/2013     Resolved Ambulatory Problems     Diagnosis Date Noted     No Resolved Ambulatory Problems     Past Medical History:   Diagnosis Date     Dyslipidemia      Prediabetes         VITALS:  Vitals:    02/14/17 0901   BP: 130/70   Patient Site: Left Arm   Patient Position: Sitting   Cuff Size: Adult Large   Pulse: 74   SpO2: 95%   Weight: (!) 266 lb 11.2 oz (121 kg)   Height: 5' 9.5\" (1.765 m)     Wt Readings from Last 3 Encounters:   02/14/17 (!) 266 lb 11.2 oz (121 kg)   12/20/16 (!) 272 lb 8 oz (123.6 kg)   03/13/15 (!) 244 lb 9.6 oz (110.9 kg)     Body mass index is 38.82 kg/(m^2).     PHYSICAL EXAM:  Visit Vitals     /70 (Patient Site: Left Arm, Patient " "Position: Sitting, Cuff Size: Adult Large)     Pulse 74     Ht 5' 9.5\" (1.765 m)     Wt (!) 266 lb 11.2 oz (121 kg)     SpO2 95%     BMI 38.82 kg/m2       Vital signs noted above. AAO ×3. Wearing glasses. HEENT: Pain in the left maxillary region.  No nasal discharge, not inflamed turbinates, moist oral mucosa.  TMs intact bilateral, no fluid collection. Neck: Supple neck, nonpalpable cervical lymph nodes. No thyromegaly. Lungs: Clear to auscultation bilateral.  Heart: S1-S2 regular rate and rhythm, no murmurs were noted.  Abdomen: Flabby, soft with bowel sounds and nontender.  Extremities: No edema, pulses were full and equal. Genital exam: No hernia noted, testes descended bilaterally. Rectal: No skin tags or fissures.  Small hemorrhoid, noninflamed.  No mass or tenderness. Prostate not enlarged. Brown stool noted.  Skin: Polypoid mass on his right anterior axillary region.  No erythema, tenderness.    QUALITY MEASURES:  The following high BMI interventions were performed this visit: encouragement to exercise, dietary needs education and lifestyle education regarding diet     DATA REVIEWED:  ADDITIONAL HISTORY SUMMARIZED (2): None.   DECISION TO OBTAIN EXTRA INFORMATION (1): None.   RADIOLOGY TESTS (1): None.  LABS (1): Reviewed and ordered labs.  MEDICINE TESTS (1): None.  INDEPENDENT REVIEW (2 each): None.      The visit lasted a total of 23 minutes face to face with the patient. Over 50% of the time was spent counseling and educating the patient about his chronic health conditions, skin tag, sinusitis, diet, exercise, and health maintenance.     IAna Luisa, am scribing for and in the presence of Dr. Patel.  IDr. Patel, personally performed the services described in this documentation, as scribed by Ana Luisa Pierce in my presence, and it is both accurate and complete.     MEDICATIONS:  Current Outpatient Prescriptions   Medication Sig Dispense Refill     predniSONE (DELTASONE) 20 MG tablet Take 1 tablet (20 mg " total) by mouth daily for 7 days. 7 tablet 0     No current facility-administered medications for this visit.         Total data points: 1

## 2021-06-09 NOTE — PROGRESS NOTES
ASSESSMENT & PLAN:  1. Type 2 diabetes mellitus without complication, without long-term current use of insulin  - Microalbumin, Random Urine    2. Dyslipidemia     Discussed lab results with him.  We'll add microalbumin testing.  If positive, we'll start him on lisinopril.  Discussed medical condition in general, newly diagnosed diabetes.  Discussed complications brought on by diabetes.  Check his feet regularly.  Yearly eye exam.  He will set up an appointment with his ophthalmologist.  Continue with healthy food choices, regular exercise.  To start aspirin 81 mg daily.  We will also start him on Lipitor.  Recheck in 3 months.  He was agreeable with the plans.    Orders Placed This Encounter   Procedures     Microalbumin, Random Urine        CHIEF COMPLAINT:  Chief Complaint   Patient presents with     Lab Result Follow Up        HISTORY OF PRESENT ILLNESS:  Sean is a 56 y.o. male presenting to the clinic today to discuss his newly diagnosed diabetes. His A1c was 6.5 and his fasting glucose was 144 on 2/14/2017. He notes two years ago, his A1c was 5.7 and he had worked it down to that point; he is wondering why his A1c is still high right now even though he has been eating well and exercising for the past two months. He is wondering if his slow metabolism or diabetes is affecting his weight gain as well as his blood glucose. He is wondering if his testosterone is low and if that is affecting his metabolism. He walks with only socks on in his house. His last eye exam was more than 1 year ago. He has been continuing to eat according to the [employer] plan for the past two months; his protein bars are low in carbohydrates and he counts his carbs. He is exercising 4-5 times per week. He lost 30 pounds on the [place] diet over the course of 7 months in 2014. His total cholesterol was 226, triglycerides were 89, HDL was 49, and LDL was 159 on 2/14/2017. He was taking a statin medication a few years ago; he had  muscle aches for the first couple of weeks, but it was tolerable and went away on its own. He discontinued the medication after decreasing the dose to half and he was able to maintain good cholesterol with his diet and exercise. He is willing to start taking atorvastatin 20 mg and aspirin 81 mg daily. His feet go to sleep when he sits for a long period of time; he is able to walk the numbness off.     REVIEW OF SYSTEMS:   Recent URI: He has finished his course of prednisone, but he has still been using a nasal saline rinse. The nasal rinse has been uncomfortable; this morning, the solution seemed to be colder than normal and it hurt his sinuses. He notes he is improving otherwise. He denies any loss of sense of smell or taste; he has not been smelling smoke for the past one week.     Elevated Blood Pressure: His last two blood pressures in clinic were higher than 130/80, and he was expecting a discussion about starting a blood pressure medication today. He notes he wakes up about once per night to urinate already.    He denies any vision changes or sudden vision loss, dizziness, fainting, tremors, shaking, localized numbness or weakness, chest pain, heart palpitations, abdominal pain, nausea, or vomiting. All other systems are negative.     PFSH:     TOBACCO USE:  History   Smoking Status     Former Smoker     Packs/day: 1.00     Years: 25.00     Types: Cigarettes     Quit date: 7/3/2007   Smokeless Tobacco     Never Used        VITALS:  Vitals:    02/21/17 0844   BP: 136/62   Patient Site: Left Arm   Patient Position: Sitting   Cuff Size: Adult Large   Pulse: (!) 59   SpO2: 97%     Wt Readings from Last 3 Encounters:   02/14/17 (!) 266 lb 11.2 oz (121 kg)   12/20/16 (!) 272 lb 8 oz (123.6 kg)   03/13/15 (!) 244 lb 9.6 oz (110.9 kg)     There is no height or weight on file to calculate BMI.     PHYSICAL EXAM:  Visit Vitals     /62 (Patient Site: Left Arm, Patient Position: Sitting, Cuff Size: Adult Large)      Pulse (!) 59     SpO2 97%       Vital signs noted above. AAO ×3.  HEENT no nasal discharge, moist oral mucosa. Wearing glasses. Neck: Supple neck, nonpalpable cervical lymph nodes. Lungs: Clear to auscultation bilateral.  Heart: S1-S2 regular rate and rhythm, no murmurs were noted.  Abdomen: Flabby, soft with bowel sounds and nontender.  Extremities: No edema, pulses were full and equal. Monofilament exam was good.  Small amount of skin break noted in the left fourth to fifth interdigit.    QUALITY MEASURES:  The following high BMI interventions were performed this visit: encouragement to exercise, dietary needs education, dietary management education, guidance, and counseling and lifestyle education regarding diet    DATA REVIEWED:  ADDITIONAL HISTORY SUMMARIZED (2): None.   DECISION TO OBTAIN EXTRA INFORMATION (1): None.   RADIOLOGY TESTS (1): None.  LABS (1): Reviewed labs 2/17/2017.  MEDICINE TESTS (1): None.  INDEPENDENT REVIEW (2 each): None.     The visit lasted a total of 23 minutes face to face with the patient. Over 50% of the time was spent counseling and educating the patient about his new diagnosis of diabetes, diet, exercise, and coordination of care.     Ana Luisa HARRIS, am scribing for and in the presence of Dr. Patel.  IDr. Patel, personally performed the services described in this documentation, as scribed by Ana Luisa Pierce in my presence, and it is both accurate and complete.     MEDICATIONS:  Current Outpatient Prescriptions   Medication Sig Dispense Refill     atorvastatin (LIPITOR) 20 MG tablet Take 1 tablet (20 mg total) by mouth daily. 30 tablet 2     predniSONE (DELTASONE) 20 MG tablet Take 1 tablet (20 mg total) by mouth daily for 7 days. 7 tablet 0     No current facility-administered medications for this visit.         Total data points: 1

## 2021-06-12 NOTE — PROGRESS NOTES
Assessment/Plan:        Diagnoses and all orders for this visit:    Diabetes mellitus  -     Glycosylated Hemoglobin A1c  -     Basic Metabolic Panel  -     Cancel: ALT (SGPT)  -     Lipid Cascade    Dyslipidemia  -     ALT (SGPT)    Other orders  -     coenzyme Q10 200 mg capsule; Take 200 mg by mouth daily.  -     blood-glucose meter Misc; Use daily as directed  Dispense: 1 each; Refill: 0  -     generic lancets (FINGERSTIX LANCETS); Dispense brand per patient's insurance at pharmacy discretion.  Dispense: 100 each; Refill: 1  -     blood glucose test strips; Use 1 each As Directed as needed. Dispense brand per patient's insurance at pharmacy discretion.  Dispense: 100 strip; Refill: 1        He is fasting.  We will do labs.  Consider testing his blood sugars and discuss goals.  To keep a log of it.  Await results prior to further recommendations.  Continue to eat healthy, regular exercise.  Check his feet regularly.  Avoid walking barefoot.  Recheck in 6 months, earlier if levels worse.  He was agreeable with the plans.    25 minutes spent with more than 50% in counseling and discussion of treatment plans.  Subjective:    Patient ID: Sean Bray is a 57 y.o. male.    DARWIN Estrada is here for follow-up regarding his diabetes.  He has been eating healthy.  No breads, carbs, sweets, milk.  Breakfast is mostly protein shake or snack.  Lunch would be salads, tuna fish, eggs o with protein bar.  Dinner would be sad with lean meat.  He has been trying to walk and hit her on 8-12,000 steps per day.  Once or twice a week, he tries to do more.  He came back from a 2 weeks trip.  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  Not much of vegetables.  Mostly loaded with carbs.  He did a lot of walking, at least 8 miles per day.  Had some blisters in his feet, numbness which lasted for around 2 days.  He checks his feet once in a while, occasional barefoot.  No vision  changes.  He did try to have his eyes checked and in February and no diabetes noted.  Denies any chest pains, palpitations, dizziness.  No localized numbness, weakness.  He did injure his right shoulder doing some lifting but has been recovering from that.  Denies any issues with smoking, alcohol.  Compliant with medication intake.  On aspirin, Lipitor.    Review of Systems  As above otherwise negative.          Objective:    Physical Exam  /60 (Patient Site: Left Arm, Patient Position: Sitting, Cuff Size: Adult Large)  Pulse 66  Wt (!) 251 lb 12.8 oz (114.2 kg)  SpO2 97%  BMI 36.65 kg/m2    Vital signs noted above. AAO ×3.  HEENT no nasal discharge, moist oral mucosa. Neck: Supple neck, nonpalpable cervical lymph nodes.  Lungs: Clear to auscultation bilateral.  Heart: S1-S2 regular rate and rhythm, no murmurs were noted.  Abdomen: Flabby, soft with bowel sounds and nontender.  Extremities: No edema, pulses were full and equal.  Dry skin on his feet with some skin peeling including in between toes.  No maceration.  No blisters.  Monofilament testing was good.

## 2021-06-15 NOTE — PROGRESS NOTES
HPI: This patient is a 58yo M who presents for evaluation of his nose at the request of Dr. Patel. The patient reports some nasal congestion and clear rhinorrhea. What bothers him most is that over the past few months, he has been smelling smoke when there is none around. There have not really been episodes of high fever, localized sinus pain, tooth pain, and pururlent drainage. Not using any nasal saline or nasal steroid sprays. No vision changes, headaches, or inability to smell other environmental odorants.    Past medical history, surgical history, social history, family history, medications, and allergies have been reviewed with the patient and are documented above.    Review of Systems: a 10-system review was performed. Pertinent positives are noted in the HPI and on a separate scanned document in the chart.    PHYSICAL EXAMINATION:  GEN: no acute distress, normocephalic  EYES: extraocular movements are intact, pupils are equal and round. Sclera clear.   EARS: auricles are normally formed. The external auditory canals are clear with minimal to no cerumen. Tympanic membranes are intact bilaterally with no signs of infection, effusion, retractions, or perforations.  NOSE: anterior nares are patent. There are no masses or lesions. The septum is non-obstructing. No polyps visualized. No lesions/masses of the superior nasal cavity.  OC/OP: clear, dentition is in good repair. The tongue and palate are fully mobile and symmetric. The floor of mouth, base of tongue are symmetric.  NECK: soft and supple. No lymphadenopathy or masses. Airway is midline.  NEURO: CN II-XII are intact bilaterally. alert and oriented. No nystagmus. Gait is normal.  PULM: breathing comfortably on room air, normal chest expansion with respiration    MEDICAL DECISION-MAKING: This patient is a 58yo M with congestion from probable allergic rhinitis and congestion. Discussed the use of nasal steroid sprays. This is likely the cause of the erroneous  odors he is perceiving as well, but will need to perform imaging of his nasal/sinus cavities and the cribriform plate area. I will call him with the results of the CT scan when it is completed.

## 2021-06-18 ENCOUNTER — OFFICE VISIT (OUTPATIENT)
Dept: OPHTHALMOLOGY | Facility: CLINIC | Age: 61
End: 2021-06-18
Payer: COMMERCIAL

## 2021-06-18 DIAGNOSIS — H57.813 BROW PTOSIS, BILATERAL: ICD-10-CM

## 2021-06-18 DIAGNOSIS — H02.834 DERMATOCHALASIS OF BOTH UPPER EYELIDS: ICD-10-CM

## 2021-06-18 DIAGNOSIS — Z98.890 POSTOPERATIVE EYE STATE: Primary | ICD-10-CM

## 2021-06-18 DIAGNOSIS — H02.831 DERMATOCHALASIS OF BOTH UPPER EYELIDS: ICD-10-CM

## 2021-06-18 PROCEDURE — 99207 PR NO BILLABLE SERVICE THIS VISIT: CPT | Mod: GC | Performed by: OPHTHALMOLOGY

## 2021-06-18 ASSESSMENT — VISUAL ACUITY
OD_CC+: -1
METHOD: SNELLEN - LINEAR
OD_CC: 20/20
OS_CC: 20/20
CORRECTION_TYPE: GLASSES

## 2021-06-18 ASSESSMENT — TONOMETRY
OS_IOP_MMHG: 09
IOP_METHOD: ICARE
OD_IOP_MMHG: 09

## 2021-06-18 NOTE — PROGRESS NOTES
Chief Complaints and History of Present Illnesses   Patient presents with     Post Op (Ophthalmology) Both Eyes     POW#2 s/p Bilateral direct brow lift, BULB     Chief Complaint(s) and History of Present Illness(es)     Post Op (Ophthalmology) Both Eyes     In both eyes.  Associated symptoms include itching (occ itching of   surgical site per pt).  Negative for discharge, burning and eye pain.    Pain was noted as 0/10. Additional comments: POW#2 s/p Bilateral direct   brow lift, BULB              Comments     Pt notes that he last used the EES pastor 4 days ago, he has no concerns   today. Doing well overall.    Samantha Grant COT June 18, 2021 9:50 AM                     Assessment & Plan     Sean Bray is a 61 year old male with the following diagnoses:   1. Postoperative eye state    2. Brow ptosis, bilateral    3. Dermatochalasis of both upper eyelids       Sean Bray is 2 weeks status post upper blepharoplasty and direct brow lift  The incision(s) are healing well.  The lid(s) and brow are in excellent position.  Suture tails over brow trimmed, healing really well.    I have recommended:  * Continue antibiotic ointment or bland lubricating ointment (eg vaseline or aquaphor) to the incision site BID  * Massage along the incision BID  * Warm soaks QID until all edema and ecchymoses resolve  * Return to clinic in 6 weeks     Lenard Caldera MD/MPH  Oculoplastics Fellow  6/18/2021 at 9:59 AM      Attending Physician Attestation:  I did not see this patient on Jun 18, 2021, but I have reviewed the relevant history, examination findings, assessment, and plan as documented by others.  I have confirmed and edited as necessary the assessment and plan and agree with this note.  - Geremias Gandhi MD 12:36 PM 6/18/2021

## 2021-06-18 NOTE — NURSING NOTE
Chief Complaints and History of Present Illnesses   Patient presents with     Post Op (Ophthalmology) Both Eyes     POW#2 s/p Bilateral direct brow lift, BULB     Chief Complaint(s) and History of Present Illness(es)     Post Op (Ophthalmology) Both Eyes     Laterality: both eyes    Associated symptoms: itching (occ itching of surgical site per pt).  Negative for discharge, burning and eye pain    Pain scale: 0/10    Comments: POW#2 s/p Bilateral direct brow lift, BULB              Comments     Pt notes that he last used the EES pastor 4 days ago, he has no concerns today. Doing well overall.    Samantha DEXTER June 18, 2021 9:50 AM

## 2021-07-03 NOTE — ADDENDUM NOTE
Addendum Note by Sarah Patel MD at 2/21/2017  1:24 PM     Author: Sarah Patel MD Service: -- Author Type: Physician    Filed: 2/21/2017  1:24 PM Encounter Date: 2/21/2017 Status: Signed    : Sarah Patel MD (Physician)    Addended by: SARAH PATEL on: 2/21/2017 01:24 PM        Modules accepted: Orders

## 2021-07-26 ENCOUNTER — OFFICE VISIT (OUTPATIENT)
Dept: OPHTHALMOLOGY | Facility: CLINIC | Age: 61
End: 2021-07-26
Payer: COMMERCIAL

## 2021-07-26 DIAGNOSIS — Z98.890 POSTOPERATIVE EYE STATE: Primary | ICD-10-CM

## 2021-07-26 PROCEDURE — 99024 POSTOP FOLLOW-UP VISIT: CPT | Mod: GC | Performed by: OPHTHALMOLOGY

## 2021-07-26 ASSESSMENT — CONF VISUAL FIELD
METHOD: COUNTING FINGERS
OD_NORMAL: 1
OS_NORMAL: 1

## 2021-07-26 ASSESSMENT — REFRACTION_WEARINGRX
OS_CYLINDER: +0.75
OS_SPHERE: +0.00
OD_AXIS: 005
OS_AXIS: 002
OS_ADD: +2.50
SPECS_TYPE: PAL
OD_SPHERE: +0.00
OD_CYLINDER: +1.25
OD_ADD: +2.50

## 2021-07-26 ASSESSMENT — VISUAL ACUITY
METHOD: SNELLEN - LINEAR
OS_CC: 20/20
OD_CC: 20/20
CORRECTION_TYPE: GLASSES

## 2021-07-26 ASSESSMENT — TONOMETRY
IOP_METHOD: ICARE
OD_IOP_MMHG: 8
OS_IOP_MMHG: 7

## 2021-07-26 NOTE — PROGRESS NOTES
Chief Complaints and History of Present Illnesses   Patient presents with     Post Op (Ophthalmology) Both Eyes     POW#2 s/p Bilateral direct brow lift, BULB     Chief Complaint(s) and History of Present Illness(es)     Post Op (Ophthalmology) Both Eyes     In both eyes.  Associated symptoms include itching (occ itching of   surgical site per pt).  Negative for eye pain, discharge and burning.    Pain was noted as 0/10. Additional comments: POW#2 s/p Bilateral direct   brow lift, BULB              Comments     Pt here today for 6 week BULB follow up.  States no pain or discomfort to report.   Healing going well. Eyebrows are a little irregular now.    Ocular meds = none    Kathy MEEHAN, CARL 3:10 PM 07/26/2021          POM1.5 s/p BUL blepharopblasty and bilateral brow ptosis repair. Patient is overall happy with the surgical outcomes, but has noticed that a few hairs on the right upper eyebrow are growing in a different direction than the other hairs. Has been trimming the brow hairs every few days.             Assessment & Plan     Sean Bray is a 61 year old male with the following diagnoses:   1. Postoperative eye state       - doing well, healing very nicely  - can continue using aquaphor PRN  - follow up as needed      Alma Ayala MD  Oculoplastics Fellow    Attending Physician Attestation:  I have seen and examined this patient with the fellow .  I have confirmed and edited as necessary the chief complaint(s), history of present illness, review of systems, relevant history, and examination findings as documented by others.  I have personally reviewed the relevant tests, images, and reports as documented above.  I have confirmed and edited as necessary the assessment and plan and agree with this note.    - Geremias Gil MD 3:39 PM 7/26/2021

## 2021-07-26 NOTE — NURSING NOTE
Chief Complaints and History of Present Illnesses   Patient presents with     Post Op (Ophthalmology) Both Eyes     POW#2 s/p Bilateral direct brow lift, BULB     Chief Complaint(s) and History of Present Illness(es)     Post Op (Ophthalmology) Both Eyes     Laterality: both eyes    Associated symptoms: itching (occ itching of surgical site per pt).  Negative for eye pain, discharge and burning    Pain scale: 0/10    Comments: POW#2 s/p Bilateral direct brow lift, BULB              Comments     Pt here today for 6 week BULB follow up.  States no pain or discomfort to report.   Healing going well. Eyebrows are a little irregular now.    Ocular meds = none    Kathy MEEHAN, CARL 3:10 PM 07/26/2021

## 2021-10-11 ENCOUNTER — HEALTH MAINTENANCE LETTER (OUTPATIENT)
Age: 61
End: 2021-10-11

## 2022-05-22 ENCOUNTER — HEALTH MAINTENANCE LETTER (OUTPATIENT)
Age: 62
End: 2022-05-22

## 2022-09-25 ENCOUNTER — HEALTH MAINTENANCE LETTER (OUTPATIENT)
Age: 62
End: 2022-09-25

## 2023-06-04 ENCOUNTER — HEALTH MAINTENANCE LETTER (OUTPATIENT)
Age: 63
End: 2023-06-04

## 2024-05-11 ENCOUNTER — HEALTH MAINTENANCE LETTER (OUTPATIENT)
Age: 64
End: 2024-05-11

## (undated) DEVICE — ESU EYE HIGH TEMP 65410-183

## (undated) DEVICE — LINEN TOWEL PACK X5 5464

## (undated) DEVICE — EYE PREP BETADINE 5% SOLUTION 30ML 0065-0411-30

## (undated) DEVICE — PACK MINOR EYE CUSTOM ASC

## (undated) DEVICE — GLOVE PROTEXIS MICRO 7.5  2D73PM75

## (undated) DEVICE — SOL WATER IRRIG 500ML BOTTLE 2F7113

## (undated) RX ORDER — PROPOFOL 10 MG/ML
INJECTION, EMULSION INTRAVENOUS
Status: DISPENSED
Start: 2021-06-02

## (undated) RX ORDER — GABAPENTIN 300 MG/1
CAPSULE ORAL
Status: DISPENSED
Start: 2021-06-02

## (undated) RX ORDER — ACETAMINOPHEN 325 MG/1
TABLET ORAL
Status: DISPENSED
Start: 2021-06-02

## (undated) RX ORDER — LIDOCAINE HYDROCHLORIDE 20 MG/ML
INJECTION, SOLUTION EPIDURAL; INFILTRATION; INTRACAUDAL; PERINEURAL
Status: DISPENSED
Start: 2021-06-02